# Patient Record
Sex: FEMALE | Race: WHITE | NOT HISPANIC OR LATINO | Employment: OTHER | URBAN - METROPOLITAN AREA
[De-identification: names, ages, dates, MRNs, and addresses within clinical notes are randomized per-mention and may not be internally consistent; named-entity substitution may affect disease eponyms.]

---

## 2019-03-04 ENCOUNTER — CONVERSION ENCOUNTER (OUTPATIENT)
Dept: RHEUMATOLOGY | Facility: CLINIC | Age: 79
End: 2019-03-04

## 2019-06-03 VITALS
BODY MASS INDEX: 35.51 KG/M2 | SYSTOLIC BLOOD PRESSURE: 117 MMHG | HEART RATE: 64 BPM | DIASTOLIC BLOOD PRESSURE: 74 MMHG | HEIGHT: 64 IN | WEIGHT: 208 LBS

## 2019-08-21 NOTE — TELEPHONE ENCOUNTER
Requested Prescriptions     Pending Prescriptions Disp Refills   • ENBREL 50 MG/ML injection [Pharmacy Med Name: ENBREL PREFILLED SYRINGE 1ML 50MG] 3.92 mL 13     Sig: INJECT THE CONTENTS OF 1 SYRINGE UNDER THE SKIN EVERY WEEK     Requests refill. Pt was to follow up in July but had to be moved due to Provider vacation. Pt has recent labs scanned in the chart. Is it ok to fill this until pt is seen. Pt has been on Enbrel for 14years with no issues.

## 2019-08-26 RX ORDER — ETANERCEPT 50 MG/ML
SOLUTION SUBCUTANEOUS
Qty: 4 SYRINGE | Refills: 2 | Status: SHIPPED | OUTPATIENT
Start: 2019-08-26 | End: 2019-08-27 | Stop reason: SDUPTHER

## 2019-08-26 NOTE — TELEPHONE ENCOUNTER
Ok to refill, but Urine culture shows gram negative freda. Does she have symptoms of UTI, If so, please ask her to follow with her PCP and hold Enbrel until she is doen with antibiotics.

## 2019-11-04 ENCOUNTER — OFFICE VISIT (OUTPATIENT)
Dept: RHEUMATOLOGY | Facility: CLINIC | Age: 79
End: 2019-11-04

## 2019-11-04 VITALS
HEART RATE: 50 BPM | DIASTOLIC BLOOD PRESSURE: 79 MMHG | SYSTOLIC BLOOD PRESSURE: 129 MMHG | BODY MASS INDEX: 36.73 KG/M2 | WEIGHT: 214 LBS

## 2019-11-04 DIAGNOSIS — M19.90 OSTEOARTHRITIS, UNSPECIFIED OSTEOARTHRITIS TYPE, UNSPECIFIED SITE: ICD-10-CM

## 2019-11-04 DIAGNOSIS — Z79.899 LONG-TERM USE OF HIGH-RISK MEDICATION: ICD-10-CM

## 2019-11-04 DIAGNOSIS — L40.50 PSORIATIC ARTHRITIS (HCC): Primary | ICD-10-CM

## 2019-11-04 DIAGNOSIS — M85.80 OSTEOPENIA, UNSPECIFIED LOCATION: ICD-10-CM

## 2019-11-04 PROCEDURE — 99214 OFFICE O/P EST MOD 30 MIN: CPT | Performed by: INTERNAL MEDICINE

## 2019-11-04 RX ORDER — AMIODARONE HYDROCHLORIDE 200 MG/1
TABLET ORAL 2 TIMES DAILY
COMMUNITY
Start: 2019-09-17

## 2019-11-04 RX ORDER — APIXABAN 5 MG/1
TABLET, FILM COATED ORAL DAILY
COMMUNITY
Start: 2019-09-08

## 2019-11-04 RX ORDER — FLUOCINONIDE 0.5 MG/G
OINTMENT TOPICAL DAILY
COMMUNITY
End: 2019-11-04 | Stop reason: SDUPTHER

## 2019-11-04 RX ORDER — FUROSEMIDE 20 MG/1
1 TABLET ORAL 2 TIMES DAILY
COMMUNITY
Start: 2019-09-30

## 2019-11-04 RX ORDER — TIZANIDINE 2 MG/1
2 TABLET ORAL
Qty: 90 TABLET | Refills: 0 | Status: SHIPPED | OUTPATIENT
Start: 2019-11-04 | End: 2020-02-24 | Stop reason: SDUPTHER

## 2019-11-04 RX ORDER — FENOFIBRATE 48 MG/1
48 TABLET, COATED ORAL DAILY
Refills: 1 | COMMUNITY
Start: 2019-09-30

## 2019-11-04 RX ORDER — FLUOCINONIDE 0.5 MG/G
OINTMENT TOPICAL DAILY
Qty: 60 G | Refills: 0 | Status: SHIPPED | OUTPATIENT
Start: 2019-11-04 | End: 2020-02-24 | Stop reason: SDUPTHER

## 2019-11-04 RX ORDER — TIZANIDINE 2 MG/1
TABLET ORAL
COMMUNITY
Start: 2019-03-04 | End: 2019-11-04 | Stop reason: SDUPTHER

## 2019-11-04 RX ORDER — AMLODIPINE BESYLATE 5 MG/1
TABLET ORAL
COMMUNITY
Start: 2019-10-12

## 2019-11-04 NOTE — PROGRESS NOTES
The patient is a pleasant 79-year-old female who comes today for management of psoriatic arthritis.  She uses Enbrel 50 mg subcutaneously every week regularly.  She is compliant with this treatment, denies side effects.  No complaints today, no joint pain, no tenderness, no joint swelling, no enthesitis or dactylitis.    Review of systems is negative for fever or chills, no chest pain, shortness of breath or cough, no abdominal pain, nausea, vomiting, diarrhea constipation.  All other systems reviewed and they were negative.    Laboratories dated 3/2019 show hepatitis panel negative, QuantiFERON-TB negative, HIV nonreactive..  Labs done 7/2019 show signs of urinary tract infection, the patient received antibiotics as per her family doctor.  Creatinine 1.2 with increased BUN of 25.  Normal calcium, alkaline phosphatase, ALT and AST are normal.  Hemoglobin, platelet,  white cell count are normal.  ESR 16.  CRP 0.2.  X-rays of the hands show scattered signs of osteoarthritis in both hands.  Chest x-ray is negative.  Dextroscoliosis noted.  X-rays of the feet show hallux valgus on the left first MTP joint.  Hyperextension of the MTP joints and flexion of the PIP joint.  Significant pes planus on the left foot.  The exam was positive for osteopenia.    No recent laboratories for review today.    Social and family history reviewed and unchanged.        Rheumatologic history:  1. Psoriatic arthritis diagnosed and the patient was in her 40s  Prior patient of Dr. Garcia  Treatment:  Current Enbrel for the last 14 years with good results      2.Osteoarthritis of both knees,   Status post bilateral knee replacement     3. Rt.  Hip replacement        Active Ambulatory Problems     Diagnosis Date Noted   • No Active Ambulatory Problems     Resolved Ambulatory Problems     Diagnosis Date Noted   • No Resolved Ambulatory Problems     Past Medical History:   Diagnosis Date   • Arthritis    • Cataract    • High cholesterol     • Hypertension    • Psoriasis      Physical examination:  Vitals:    11/04/19 1357   BP: 129/79   Pulse: 50     GENERAL: Well-developed, well-nourished in no acute distress. Alert and oriented x3.  HEENT: Normocephalic, atraumatic. Pupils are equal, round, and reactive to light. Extraocular muscles are intact. Mucous membranes are pink and moist. Nostrils are clear.   NECK: Supple without lymphadenopathy.  LUNGS: Clear to auscultation bilaterally.  HEART: Regular rate and rhythm without murmur, rub or gallop.  CHEST: Respirations easy and unlabored.  EXTREMITIES: No cyanosis, edema or clubbing.  SKIN: Warm, dry and intact.  MSK: Heberden's and Aletha nodes.  No joint tenderness, no synovitis.    Assessment:  1.  Psoriatic arthritis.  In remission.  Physical exam benign.  No recent laboratories to evaluate her from that standpoint.  Continue with Enbrel as is.  Labs to be done.    2.  Psoriasis.  Unchanged.  If compromises less than 1% of the body surface area.  She requests a refill for her topical corticosteroid prescribed by her prior rheumatologist.  Refill will be sent.    3.  Long-term high-risk medications.  On medications for management of psoriatic arthritis.  I will monitor for side effects.  Recommend to keep updated with her vaccinations.      Cancer screening:  Colonoscopy:     2017     Mammogram: 2017  Bone health: Calcium and vitamin D:    NO      DXA Scan:  3/2019  Vaccines: Flu:  10/2019      [id number]:  2017  PNA 23; 2018      Shingrix both doses per pt 2019  X-rays of the chest, Hands,  Feet: 3/4/2019  Hepatitis panel, HIV, QTB/PPD:  3/19    #4 osteopenia.  New to me.  Recommend calcium and vitamin D 1200 mg / 1000 international units p.o. daily.  DEXA to be done 2021.    5.  Sleep problems.  The patient has been prescribed tizanidine 2 mg to be used at night by her prior rheumatologist.  She requests a refill.  Refill will be sent.    Plan:  Continue with Enbrel 50 mg subcutaneously once a  week  Take calcium and vitamin D 1200 mg p.o. daily  Keep up-to-date with age-appropriate cancer screening and vaccinations by PCP  Okay to refill tizanidine for now  RTC 3 months or sooner if needed

## 2020-02-13 DIAGNOSIS — M19.90 OSTEOARTHRITIS, UNSPECIFIED OSTEOARTHRITIS TYPE, UNSPECIFIED SITE: ICD-10-CM

## 2020-02-13 DIAGNOSIS — Z79.899 LONG-TERM USE OF HIGH-RISK MEDICATION: ICD-10-CM

## 2020-02-13 DIAGNOSIS — M85.80 OSTEOPENIA, UNSPECIFIED LOCATION: ICD-10-CM

## 2020-02-13 DIAGNOSIS — L40.50 PSORIATIC ARTHRITIS (HCC): ICD-10-CM

## 2020-02-14 RX ORDER — TIZANIDINE 2 MG/1
TABLET ORAL
Qty: 90 TABLET | Refills: 4 | OUTPATIENT
Start: 2020-02-14

## 2020-02-18 RX ORDER — ETANERCEPT 50 MG/ML
SOLUTION SUBCUTANEOUS
Qty: 4 SYRINGE | Refills: 0 | Status: SHIPPED | OUTPATIENT
Start: 2020-02-18 | End: 2020-04-06

## 2020-02-24 ENCOUNTER — OFFICE VISIT (OUTPATIENT)
Dept: RHEUMATOLOGY | Facility: CLINIC | Age: 80
End: 2020-02-24

## 2020-02-24 VITALS
HEART RATE: 114 BPM | SYSTOLIC BLOOD PRESSURE: 112 MMHG | HEIGHT: 65 IN | BODY MASS INDEX: 35.32 KG/M2 | DIASTOLIC BLOOD PRESSURE: 77 MMHG | WEIGHT: 212 LBS

## 2020-02-24 DIAGNOSIS — L40.50 PSORIATIC ARTHRITIS (HCC): Primary | ICD-10-CM

## 2020-02-24 DIAGNOSIS — Z79.899 LONG-TERM USE OF HIGH-RISK MEDICATION: ICD-10-CM

## 2020-02-24 DIAGNOSIS — M19.90 OSTEOARTHRITIS, UNSPECIFIED OSTEOARTHRITIS TYPE, UNSPECIFIED SITE: ICD-10-CM

## 2020-02-24 DIAGNOSIS — L40.9 PSORIASIS: ICD-10-CM

## 2020-02-24 DIAGNOSIS — G47.8 POOR SLEEP PATTERN: ICD-10-CM

## 2020-02-24 DIAGNOSIS — M85.80 OSTEOPENIA, UNSPECIFIED LOCATION: ICD-10-CM

## 2020-02-24 PROCEDURE — 99214 OFFICE O/P EST MOD 30 MIN: CPT | Performed by: INTERNAL MEDICINE

## 2020-02-24 RX ORDER — FLUOCINONIDE TOPICAL SOLUTION USP, 0.05% 0.5 MG/ML
SOLUTION TOPICAL
Qty: 60 ML | Refills: 1 | Status: SHIPPED | OUTPATIENT
Start: 2020-02-24

## 2020-02-24 RX ORDER — FLUOCINONIDE TOPICAL SOLUTION USP, 0.05% 0.5 MG/ML
SOLUTION TOPICAL 2 TIMES DAILY
COMMUNITY
End: 2020-02-24 | Stop reason: SDUPTHER

## 2020-02-24 RX ORDER — TIZANIDINE 2 MG/1
2 TABLET ORAL
Qty: 90 TABLET | Refills: 0 | Status: SHIPPED | OUTPATIENT
Start: 2020-02-24 | End: 2020-02-24

## 2020-02-24 RX ORDER — TIZANIDINE 2 MG/1
2 TABLET ORAL
Qty: 90 TABLET | Refills: 3 | Status: SHIPPED | OUTPATIENT
Start: 2020-02-24

## 2020-02-24 NOTE — PROGRESS NOTES
HPI:  The patient is a very pleasant 80-year-old female with psoriatic arthritis who comes today in follow-up.  She was seen in the office this past 11/4/2019.  Her treatment consists of Enbrel 50 mg subcutaneously once a week.  She is compliant, denies side effects.  Overall she is doing very well, denies joint tenderness, no joint swelling, no morning stiffness.  She has scant lesions in her scalp and she uses fluocinonide solution and that helps a lot.    Review of systems is positive for occasional dry eyes and dry mouth, she developed bronchitis week or so ago, she received antibiotics as per her PCP, she does not know which antibiotic was prescribed, she developed pruritus a couple of days after.  She continues to have cough but denies fever or chills, no changes in her weight, no night sweats, no shortness of breath.  All other systems reviewed and they were negative.    Laboratories reviewed for this visit show creatinine 1.3, liver function test normal, calcium within normal limits, she has normal white cell count, hemoglobin and platelet count, ESR 42 and CRP 2.1 (0.9).      Social and family history reviewed and unchanged.         Rheumatologic history:  1. Psoriatic arthritis diagnosed and the patient was in her 40s  Prior patient of Dr. Garcia  Treatment:  Current Enbrel for the last 14 years with good results      2.Osteoarthritis of both knees,   Status post bilateral knee replacement     3. Rt.  Hip replacement      Past Medical History:   Diagnosis Date   • Arthritis    • Cataract    • High cholesterol    • Hypertension    • Psoriasis        Current Outpatient Medications   Medication Sig Dispense Refill   • amiodarone (PACERONE) 200 MG tablet 2 (Two) Times a Day.     • amLODIPine (NORVASC) 5 MG tablet      • Calcium-Magnesium-Vitamin D (CALCIUM 1200+D3 PO) CALCIUM-VITAMIN D TABS     • ELIQUIS 5 MG tablet tablet Daily.     • ENBREL 50 MG/ML injection INJECT 1 ML UNDER THE SKIN INTO THE  APPROPRIATE AREA AS DIRECTED 1 (ONE) TIME PER WEEK 4 syringe 0   • etanercept (ENBREL) 50 MG/ML solution prefilled syringe injection Inject 1 mL under the skin into the appropriate area as directed 1 (One) Time Per Week. 4 syringe 2   • fenofibrate (TRICOR) 48 MG tablet Take 48 mg by mouth Daily.  1   • fluocinonide (LIDEX) 0.05 % ointment Apply  topically to the appropriate area as directed Daily. 60 g 0   • furosemide (LASIX) 20 MG tablet Take 1 tablet by mouth 2 (Two) Times a Day.     • metoprolol tartrate (LOPRESSOR) 25 MG tablet Take 25 mg by mouth 2 (Two) Times a Day.  1   • tiZANidine (ZANAFLEX) 2 MG tablet Take 1 tablet by mouth every night at bedtime. 90 tablet 0     No current facility-administered medications for this visit.        Physical exam:    Vitals:    02/24/20 1203   BP: 112/77   Pulse: 114      GENERAL: Well-developed, well-nourished in no acute distress. Alert and oriented x3.  HEENT: Normocephalic, atraumatic. Pupils are equal, round, and reactive to light. Extraocular muscles are intact. Mucous membranes are pink and moist. Nostrils are clear.   NECK: Supple without lymphadenopathy.  LUNGS: Clear to auscultation bilaterally.  HEART: Regular rate and rhythm without murmur, rub or gallop.  CHEST: Respirations easy and unlabored.  EXTREMITIES: No cyanosis, edema or clubbing.  SKIN: Warm, dry and intact.  MSK: No joint tenderness, no synovitis.  Heberden's and Aletha nodes.    Assessment:  1.  Psoriatic arthritis.  Unchanged.  Managing well with Enbrel.  We recommend to hold her Enbrel treatment for now as she is having bronchitis.  Okay to resume treatment with Enbrel after this is completely resolved.    2.  Psoriasis.  Doing well with current therapy.  We will refill her medications.    3.  Bronchitis.  New to me.  I have recommended to the patient to follow-up with her primary care doctor as she developed pruritus after her second dose of antibiotics.    4.  Long-term high-risk medications.   The patient was recommended to keep her today with her regular annual exams, cancer screenings.  Vaccinations.      Cancer screening:  Colonoscopy:     2017     Mammogram: 2017  Bone health: Calcium and vitamin D:    NO      DXA Scan:  3/2019  Vaccines: Flu:  10/2019      [id number]:  2017  PNA 23; 2018      Shingrix both doses per pt 2019  X-rays of the chest, Hands,  Feet: 3/4/2019  Hepatitis panel, HIV, QTB/PPD:  3/19    5.  Osteopenia.  Recommend to continue calcium and vitamin D.  Will check vitamin D levels on the next visit.  DEXA due in 2021.    6.  Sleep disturbance.  The patient was prescribed tizanidine 2 mg at night by her prior rheumatologist.  I have asked the patient to discuss this treatment with her PCP, I will provide her refills for 3 months until she sees her PCP.    Plan:  Continue with Enbrel 50 mg of cutaneously once a week  Take calcium and vitamin D 1200 mg / 1000 international units p.o. a day.  Refill tizanidine 2 mg p.o. at night  Okay to refill fluocinonide solution to be used in the scalp  Follow-up with PCP regarding bronchitis.  RTC 3 months or sooner if needed    Orders Placed This Encounter   Procedures   • Comprehensive Metabolic Panel     Standing Status:   Future   • C-reactive Protein     Standing Status:   Future   • Sedimentation Rate     Standing Status:   Future   • Vitamin D 25 Hydroxy   • CBC With Manual Differential     Standing Status:   Future

## 2020-03-31 RX ORDER — ETANERCEPT 50 MG/ML
SOLUTION SUBCUTANEOUS
Qty: 4 ML | Refills: 12 | OUTPATIENT
Start: 2020-03-31

## 2020-04-04 DIAGNOSIS — L40.50 PSORIATIC ARTHRITIS (HCC): Primary | ICD-10-CM

## 2020-04-06 RX ORDER — ETANERCEPT 50 MG/ML
SOLUTION SUBCUTANEOUS
Qty: 12 SYRINGE | Refills: 0 | Status: SHIPPED | OUTPATIENT
Start: 2020-04-06 | End: 2020-04-27 | Stop reason: SDUPTHER